# Patient Record
Sex: FEMALE | Race: WHITE
[De-identification: names, ages, dates, MRNs, and addresses within clinical notes are randomized per-mention and may not be internally consistent; named-entity substitution may affect disease eponyms.]

---

## 2018-05-02 ENCOUNTER — HOSPITAL ENCOUNTER (EMERGENCY)
Dept: HOSPITAL 25 - ED | Age: 18
Discharge: HOME | End: 2018-05-02
Payer: SELF-PAY

## 2018-05-02 DIAGNOSIS — I82.4Z1: Primary | ICD-10-CM

## 2018-05-02 DIAGNOSIS — M79.604: ICD-10-CM

## 2018-05-02 LAB
BASOPHILS # BLD AUTO: 0.1 10^3/UL (ref 0–0.2)
EOSINOPHIL # BLD AUTO: 0.2 10^3/UL (ref 0–0.6)
HCT VFR BLD AUTO: 42 % (ref 35–47)
HGB BLD-MCNC: 14.2 G/DL (ref 12–16)
INR PPP/BLD: 0.85 (ref 0.77–1.02)
LYMPHOCYTES # BLD AUTO: 2.8 10^3/UL (ref 1–4.8)
MCH RBC QN AUTO: 30 PG (ref 27–31)
MCHC RBC AUTO-ENTMCNC: 34 G/DL (ref 31–36)
MCV RBC AUTO: 89 FL (ref 80–97)
MONOCYTES # BLD AUTO: 0.7 10^3/UL (ref 0–0.8)
NEUTROPHILS # BLD AUTO: 5.7 10^3/UL (ref 1.5–7.7)
NRBC # BLD AUTO: 0 10^3/UL
NRBC BLD QL AUTO: 0
PLATELET # BLD AUTO: 207 10^3/UL (ref 150–450)
RBC # BLD AUTO: 4.69 10^6/UL (ref 4–5.4)
WBC # BLD AUTO: 9.4 10^3/UL (ref 3.5–10.8)

## 2018-05-02 PROCEDURE — 99282 EMERGENCY DEPT VISIT SF MDM: CPT

## 2018-05-02 PROCEDURE — 85730 THROMBOPLASTIN TIME PARTIAL: CPT

## 2018-05-02 PROCEDURE — 85025 COMPLETE CBC W/AUTO DIFF WBC: CPT

## 2018-05-02 PROCEDURE — 80053 COMPREHEN METABOLIC PANEL: CPT

## 2018-05-02 PROCEDURE — 36415 COLL VENOUS BLD VENIPUNCTURE: CPT

## 2018-05-02 PROCEDURE — 85610 PROTHROMBIN TIME: CPT

## 2018-05-02 NOTE — RAD
HISTORY: Right lower leg pain



COMPARISONS: None relevant



TECHNIQUE: Multiple transverse and longitudinal ultrasound images were obtained of the

right lower extremity from the level of the common femoral vein inferiorly through to the

infrapopliteal veins  using grayscale, color Doppler, and spectral Doppler imaging with

and without compression and with augmentation.    



FINDINGS:

VEINS: There is partially occlusive thrombus noted within the inferior extent of the right

femoral vein. There is occlusive thrombus within the peroneal vein..



SOFT TISSUES: Unremarkable.



OTHER FINDINGS: None.



IMPRESSION: 

PARTIALLY OCCLUSIVE THROMBUS OF THE INFERIOR EXTENT OF THE RIGHT FEMORAL VEIN, WITH

OCCLUSIVE THROMBUS OF A CALF VEIN.

## 2018-05-02 NOTE — ED
Lower Extremity





- HPI Summary


HPI Summary: 


17 female presents with right lower leg pain for the past 2 weeks.  States the 

pain woke him out of his sleep.  States pain is worse when she palpates area.  

It is also worse when she ambulates.  She denies any known injury.  She denies 

any fevers.  She denies any spreading redness.  She denies history of blood 

clots.  She admits to family history of blood clots.  She denies any chest pain 

or shortness breath.  She has been a little bit more active than normal.  She 

just got out of prison.  She states her shoes are not old.  She is a smoker and 

she is on testerone. 





- History of Current Complaint


Chief Complaint: EDExtremityLower


Stated Complaint: RT LEG PAIN


Time Seen by Provider: 05/02/18 18:34


Pain Intensity: 7





- Allergies/Home Medications


Allergies/Adverse Reactions: 


 Allergies











Allergy/AdvReac Type Severity Reaction Status Date / Time


 


aspirin Allergy  Anaphylatic Verified 05/02/18 18:32





   Shock  


 


Penicillins Allergy  Anaphylatic Verified 05/02/18 18:32





   Shock  














PMH/Surg Hx/FS Hx/Imm Hx


Endocrine/Hematology History: 


   Denies: Hx Anticoagulant Therapy


Respiratory History: 


   Denies: Hx Asthma


Infectious Disease History: No


Infectious Disease History: 


   Denies: Traveled Outside the [place] in Last 30 Days





- Family History


Known Family History: 


   Negative: Blood Disorder





Review of Systems


Negative: Fever


Negative: Chest Pain


Negative: Shortness Of Breath


Positive: Myalgia - right shin


All Other Systems Reviewed And Are Negative: Yes





Physical Exam


Triage Information Reviewed: Yes


Vital Signs On Initial Exam: 


 Initial Vitals











Temp Pulse Resp BP Pulse Ox


 


 99.1 F   94   16   124/78   98 


 


 05/02/18 18:29  05/02/18 18:29  05/02/18 18:29  05/02/18 18:29  05/02/18 18:29











Vital Signs Reviewed: Yes


Appearance: Positive: Well-Appearing


Skin: Positive: Warm, Dry


Head/Face: Positive: Normal Head/Face Inspection


Eyes: Positive: Normal, Conjunctiva Clear


Respiratory/Lung Sounds: Positive: Clear to Auscultation, Breath Sounds Present


Cardiovascular: Positive: Normal, RRR


Musculoskeletal: Positive: Strength/ROM Intact - right leg, Other - good pulses

, tenderness over right shin, good pulses, sensation grossly intact.  Negative: 

Edema Right


Neurological: Positive: Normal


Psychiatric: Positive: Normal





Diagnostics





- Vital Signs


 Vital Signs











  Temp Pulse Resp BP Pulse Ox


 


 05/02/18 18:29  99.1 F  94  16  124/78  98














- Laboratory


Result Diagrams: 


 05/02/18 20:30





 05/02/18 20:30


Lab Statement: Any lab studies that have been ordered have been reviewed, and 

results considered in the medical decision making process.





- Radiology


  ** lower leg


Xray Interpretation: No Acute Changes


Radiology Interpretation Completed By: Radiologist





- Ultrasound


  ** No standard instances


Ultrasound Interpretation: Positive (See Comments) - IMPRESSION: PARTIALLY 

OCCLUSIVE THROMBUS OF THE INFERIOR EXTENT OF THE RIGHT FEMORAL VEIN, WITH 

OCCLUSIVE THROMBUS OF A CALF VEIN.


Ultrasound Interpretation Completed By: Radiologist





Lower Extremity Course/Dx





- Course


Course Of Treatment: 17 female presents with right lower leg pain for the past 

2 weeks.  States the pain woke him out of his sleep.  States pain is worse when 

she palpates area.  It is also worse when she ambulates.  She denies any known 

injury.  She denies any fevers.  She denies any spreading redness.  She denies 

history of blood clots.  She admits to family history of blood clots.  She 

denies any chest pain or shortness breath.  She has been a little bit more 

active than normal.  She just got out of prison.  She states her shoes are not 

old.  She is a smoker and she is on testerone. xray normal. u/s shows clot in 

right femoral vein and calf vein. discussed options and will treat xarelto. 

told to follow up with primary for continued care. patient understand and 

agrees with plan.





- Diagnoses


Differential Diagnosis/HQI/PQRI: Positive: DVT, Fracture (Closed), Sprain, 

Strain


Provider Diagnoses: 


 Right leg DVT








Discharge





- Sign-Out/Discharge


Documenting (check all that apply): Discharge/Admit/Transfer





- Discharge Plan


Condition: Good


Disposition: HOME


Prescriptions: 


Rivaroxaban TAB(*) [Xarelto 15 mg(*)] 15 mg PO BID #41 tab


Patient Education Materials:  Rivaroxaban (By mouth), Deep Vein Thrombosis (ED)


Referrals: 


[facility] [place] [place] [place]


Additional Instructions: 


Take xarelto twice a day for 21 days then once a day


Take with food


Avoid Aspirin or ibuprofen, use Tylenol for pain


Follow up with primary care physician for continued care


Return to ED if develop any chest pain or SOB any new or worsening symptoms   








- Billing Disposition and Condition


Condition: GOOD


Disposition: HOME

## 2018-05-02 NOTE — RAD
HISTORY: Right leg pain



COMPARISONS: None



VIEWS: 2, Frontal and lateral views of the right foreleg



FINDINGS:



BONE DENSITY: Normal.

BONES: There is no displaced fracture. There is no appreciable erosion or periosteal

reaction.

JOINTS: There is no arthropathy.    

ALIGNMENT: There is no dislocation. 

SOFT TISSUES: Unremarkable.



OTHER FINDINGS: None.



IMPRESSION: 

NO ACUTE OSSEOUS INJURY. IF SYMPTOMS PERSIST, RECOMMEND REPEAT IMAGING.

## 2018-05-03 VITALS — SYSTOLIC BLOOD PRESSURE: 125 MMHG | DIASTOLIC BLOOD PRESSURE: 83 MMHG

## 2018-05-06 ENCOUNTER — HOSPITAL ENCOUNTER (EMERGENCY)
Dept: HOSPITAL 25 - ED | Age: 18
Discharge: HOME | End: 2018-05-06
Payer: COMMERCIAL

## 2018-05-06 VITALS — SYSTOLIC BLOOD PRESSURE: 108 MMHG | DIASTOLIC BLOOD PRESSURE: 84 MMHG

## 2018-05-06 DIAGNOSIS — R31.9: Primary | ICD-10-CM

## 2018-05-06 DIAGNOSIS — Z88.0: ICD-10-CM

## 2018-05-06 LAB
BASOPHILS # BLD AUTO: 0.1 10^3/UL (ref 0–0.2)
EOSINOPHIL # BLD AUTO: 0.1 10^3/UL (ref 0–0.6)
HCT VFR BLD AUTO: 43 % (ref 42–52)
HGB BLD-MCNC: 14.6 G/DL (ref 14–18)
INR PPP/BLD: 1.06 (ref 0.77–1.02)
LYMPHOCYTES # BLD AUTO: 1.5 10^3/UL (ref 1–4.8)
MCH RBC QN AUTO: 31 PG (ref 27–31)
MCHC RBC AUTO-ENTMCNC: 34 G/DL (ref 31–36)
MCV RBC AUTO: 89 FL (ref 80–94)
MONOCYTES # BLD AUTO: 0.6 10^3/UL (ref 0–0.8)
NEUTROPHILS # BLD AUTO: 4.3 10^3/UL (ref 1.5–7.7)
NRBC # BLD AUTO: 0 10^3/UL
NRBC BLD QL AUTO: 0
PLATELET # BLD AUTO: 193 10^3/UL (ref 150–450)
RBC # BLD AUTO: 4.8 10^6/UL (ref 4–5.4)
WBC # BLD AUTO: 6.5 10^3/UL (ref 3.5–10.8)

## 2018-05-06 PROCEDURE — 85610 PROTHROMBIN TIME: CPT

## 2018-05-06 PROCEDURE — 99282 EMERGENCY DEPT VISIT SF MDM: CPT

## 2018-05-06 PROCEDURE — 85025 COMPLETE CBC W/AUTO DIFF WBC: CPT

## 2018-05-06 PROCEDURE — 81003 URINALYSIS AUTO W/O SCOPE: CPT

## 2018-05-06 PROCEDURE — 81015 MICROSCOPIC EXAM OF URINE: CPT

## 2018-05-06 PROCEDURE — 36415 COLL VENOUS BLD VENIPUNCTURE: CPT

## 2018-05-06 PROCEDURE — 80053 COMPREHEN METABOLIC PANEL: CPT

## 2018-05-06 NOTE — ED
Corina RUTH Elizabeth, scribed for Daniel Gan MD on 05/06/18 at 1043 .





GI/ HPI





- HPI Summary


HPI Summary: 


This patient is a 17 year old F presenting to [facility] from [facility] via EMS with a 

chief complaint of hematuria since 1 day ago. Patient notes that she thought it 

was her period but was convinced to seek medical help at the insistence of the 

[facility] staff.


Symptoms alleviated by nothing. The patient is a transgender male currently on 

hormone replacement therapy. The patient has been taking Xarelto for 7 days 

after recently being diagnosed with a blood clot in the leg. The patient has a 

hx of drug addiction.





- History of Current Complaint


Stated Complaint: ABNORMAL BLEEDING


Hx Obtained From: Patient, EMS


Onset/Duration: Started Days Ago - started 1 day ago


Timing: Intermittent


Severity: Moderate


Current Severity: Moderate


Pain Intensity: 0


Associated Signs and Symptoms: Positive: Hematuria, Other: - leg pain


Alleviating Factor(s): Nothing





- Allergy/Home Medications


Allergies/Adverse Reactions: 


 Allergies











Allergy/AdvReac Type Severity Reaction Status Date / Time


 


aspirin Allergy  Anaphylatic Verified 05/06/18 10:32





   Shock  


 


bee pollen Allergy  Anaphylatic Verified 05/06/18 10:32





   Shock  


 


bee venom protein (honey bee) Allergy  Anaphylatic Verified 05/06/18 10:32





   Shock  


 


Penicillins Allergy  Anaphylatic Verified 05/06/18 10:32





   Shock  


 


SEAFOOD Allergy  Anaphylatic Uncoded 05/06/18 10:32





   Shock  











Home Medications: 


 Home Medications





Lamotrigine XR (NF) [Lamictal XR (NF)] 50 mg PO DAILY 05/06/18 [History 

Confirmed 05/06/18]


LoraTADine TAB(NF) [Claritin 10 MG TAB(NF)] 10 mg PO DAILY 05/06/18 [History 

Confirmed 05/06/18]


Magnesium Oxide TAB* [MagOx 400 TAB*] 400 mg PO DAILY 05/06/18 [History 

Confirmed 05/06/18]


Nicotine PATCH 14 MG/24 HR* 14 mg TRANSDERM DAILY 05/06/18 [History Confirmed 05 /06/18]


OXcarbazepine TAB(*) [Trileptal 300 mg TAB(*)] 75 mg PO BID 05/06/18 [History 

Confirmed 05/06/18]


Prazosin CAP* [Minipress CAP*] 10 mg PO BEDTIME 05/06/18 [History Confirmed 05/ 06/18]


QUEtiapine TAB* [Seroquel TAB*] 300 mg PO BEDTIME 05/06/18 [History Confirmed 05 /06/18]


Sertraline* [Zoloft*] 125 mg PO DAILY 05/06/18 [History Confirmed 05/06/18]


Testosterone Cypionate (NF) 200 mg IM Q14D 05/06/18 [History Confirmed 05/06/18]











PMH/Surg Hx/FS Hx/Imm Hx


Cardiovascular History: Reports: Other Cardiovascular Problems/Disorders - hx 

of blood clots


Respiratory History: Reports: Hx Asthma


Infectious Disease History: No


Infectious Disease History: 


   Denies: Traveled Outside the [place] in Last 30 Days





- Family History


Known Family History: Positive: None - pt denies any family hx





- Social History


Hx Substance Use: Yes





Review of Systems


Negative: Epistaxis


Positive: hematuria


Musculoskeletal: Other - leg pain


All Other Systems Reviewed And Are Negative: Yes





Physical Exam





- Summary


Physical Exam Summary: 


Appearance: The patient is well-nourished in no acute distress and in no acute 

pain.


 


Skin: The skin is warm and dry and skin color reflects adequate perfusion.


 


HEENT: The head is normocephalic and atraumatic. The pupils are equal and 

reactive. The conjunctivae are clear and without drainage. Nares are patent and 

without drainage. Mouth reveals moist mucous membranes and the throat is 

without erythema and exudate. The external ears are intact. The ear canals are 

patent and without drainage. The tympanic membranes are intact.


 


Neck: the neck is supple with full range of motion and non-tender. There are no 

carotid bruits. There is no neck vein distension.


 


Respiratory: Chest is non-tender. Lungs are clear to auscultation and breath 

sounds are symmetrical and equal.


 


Cardiovascular: Heart is regular rate and rhythm. There is no murmur or rub 

auscultated. There is no peripheral edema and pulses are symmetrical and equal.


 


Abdomen: The abdomen is soft and non-tender. There are normal bowel sounds 

heard in all four quadrants and there is no organomegaly palpated.


 


Musculoskeletal: There is no back tenderness noted. Extremities are non-tender 

with full range of motion. There is good capillary refill. There is no 

peripheral edema or calf tenderness elicited.


 


Neurological: Patient is alert and oriented to person, place and time. The 

patient has symmetrical motor strength in all four extremities. Cranial nerves 

are grossly intact. Deep tendon reflexes are symmetrical and equal in all four 

extremities.


 


Psychiatric: The patient has an appropriate affect and does not exhibit any 

anxiety or depression.





Triage Information Reviewed: Yes


Vital Signs On Initial Exam: 


 Initial Vitals











Temp Pulse Resp BP Pulse Ox


 


 99.0 F   93   15   118/75   98 


 


 05/06/18 10:29  05/06/18 10:29  05/06/18 10:29  05/06/18 10:29  05/06/18 10:29











Vital Signs Reviewed: Yes





Diagnostics





- Vital Signs


 Vital Signs











  Temp Pulse Resp BP Pulse Ox


 


 05/06/18 10:29  99.0 F  93  15  118/75  98














- Laboratory


Lab Results: 


 Lab Results











  05/06/18 05/06/18 05/06/18 Range/Units





  10:43 11:12 11:12 


 


WBC   6.5   (3.5-10.8)  10^3/ul


 


RBC   4.80   (4.0-5.4)  10^6/ul


 


Hgb   14.6   (14.0-18.0)  g/dl


 


Hct   43   (42-52)  %


 


MCV   89   (80-94)  fL


 


MCH   31   (27-31)  pg


 


MCHC   34   (31-36)  g/dl


 


RDW   13   (10.5-15)  %


 


Plt Count   193   (150-450)  10^3/ul


 


MPV   8.7   (7.4-10.4)  um3


 


Neut % (Auto)   65.8   (38-83)  %


 


Lymph % (Auto)   22.8 L   (25-47)  %


 


Mono % (Auto)   8.5 H   (0-7)  %


 


Eos % (Auto)   2.1   (0-6)  %


 


Baso % (Auto)   0.8   (0-2)  %


 


Absolute Neuts (auto)   4.3   (1.5-7.7)  10^3/ul


 


Absolute Lymphs (auto)   1.5   (1.0-4.8)  10^3/ul


 


Absolute Monos (auto)   0.6   (0-0.8)  10^3/ul


 


Absolute Eos (auto)   0.1   (0-0.6)  10^3/ul


 


Absolute Basos (auto)   0.1   (0-0.2)  10^3/ul


 


Absolute Nucleated RBC   0   10^3/ul


 


Nucleated RBC %   0   


 


INR (Anticoag Therapy)    1.06 H  (0.77-1.02)  


 


Sodium     (139-145)  mmol/L


 


Potassium     (3.5-5.0)  mmol/L


 


Chloride     (101-111)  mmol/L


 


Carbon Dioxide     (22-32)  mmol/L


 


Anion Gap     (2-11)  mmol/L


 


BUN     (6-24)  mg/dL


 


Creatinine     (0.51-0.95)  mg/dL


 


Est GFR ( Amer)     (>60)  


 


Est GFR (Non-Af Amer)     (>60)  


 


BUN/Creatinine Ratio     (8-20)  


 


Glucose     ()  mg/dL


 


Calcium     (8.6-10.3)  mg/dL


 


Total Bilirubin     (0.2-1.0)  mg/dL


 


AST     (13-39)  U/L


 


ALT     (7-52)  U/L


 


Alkaline Phosphatase     ()  U/L


 


Total Protein     (6.4-8.9)  g/dL


 


Albumin     (3.2-5.2)  g/dL


 


Globulin     (2-4)  g/dL


 


Albumin/Globulin Ratio     (1-3)  


 


Urine Color  Yellow    


 


Urine Appearance  Cloudy    


 


Urine pH  7.0    (5-9)  


 


Ur Specific Gravity  1.013    (1.010-1.030)  


 


Urine Protein  Negative    (Negative)  


 


Urine Ketones  Negative    (Negative)  


 


Urine Blood  1+ A    (Negative)  


 


Urine Nitrate  Negative    (Negative)  


 


Urine Bilirubin  Negative    (Negative)  


 


Urine Urobilinogen  Negative    (Negative)  


 


Ur Leukocyte Esterase  Negative    (Negative)  


 


Urine WBC (Auto)  Absent    (Absent)  


 


Urine RBC (Auto)  1+(3-5/hpf) A    (Absent)  


 


Ur Squamous Epith Cells  Present A    (Absent)  


 


Urine Bacteria  Absent    (Absent)  


 


Urine Glucose  Negative    (Negative)  














  05/06/18 Range/Units





  11:12 


 


WBC   (3.5-10.8)  10^3/ul


 


RBC   (4.0-5.4)  10^6/ul


 


Hgb   (14.0-18.0)  g/dl


 


Hct   (42-52)  %


 


MCV   (80-94)  fL


 


MCH   (27-31)  pg


 


MCHC   (31-36)  g/dl


 


RDW   (10.5-15)  %


 


Plt Count   (150-450)  10^3/ul


 


MPV   (7.4-10.4)  um3


 


Neut % (Auto)   (38-83)  %


 


Lymph % (Auto)   (25-47)  %


 


Mono % (Auto)   (0-7)  %


 


Eos % (Auto)   (0-6)  %


 


Baso % (Auto)   (0-2)  %


 


Absolute Neuts (auto)   (1.5-7.7)  10^3/ul


 


Absolute Lymphs (auto)   (1.0-4.8)  10^3/ul


 


Absolute Monos (auto)   (0-0.8)  10^3/ul


 


Absolute Eos (auto)   (0-0.6)  10^3/ul


 


Absolute Basos (auto)   (0-0.2)  10^3/ul


 


Absolute Nucleated RBC   10^3/ul


 


Nucleated RBC %   


 


INR (Anticoag Therapy)   (0.77-1.02)  


 


Sodium  139  (139-145)  mmol/L


 


Potassium  3.9  (3.5-5.0)  mmol/L


 


Chloride  104  (101-111)  mmol/L


 


Carbon Dioxide  28  (22-32)  mmol/L


 


Anion Gap  7  (2-11)  mmol/L


 


BUN  12  (6-24)  mg/dL


 


Creatinine  0.80  (0.51-0.95)  mg/dL


 


Est GFR ( Amer)  118.8  (>60)  


 


Est GFR (Non-Af Amer)  92.4  (>60)  


 


BUN/Creatinine Ratio  15.0  (8-20)  


 


Glucose  86  ()  mg/dL


 


Calcium  10.0  (8.6-10.3)  mg/dL


 


Total Bilirubin  0.40  (0.2-1.0)  mg/dL


 


AST  19  (13-39)  U/L


 


ALT  17  (7-52)  U/L


 


Alkaline Phosphatase  69  ()  U/L


 


Total Protein  7.1  (6.4-8.9)  g/dL


 


Albumin  4.4  (3.2-5.2)  g/dL


 


Globulin  2.7  (2-4)  g/dL


 


Albumin/Globulin Ratio  1.6  (1-3)  


 


Urine Color   


 


Urine Appearance   


 


Urine pH   (5-9)  


 


Ur Specific Gravity   (1.010-1.030)  


 


Urine Protein   (Negative)  


 


Urine Ketones   (Negative)  


 


Urine Blood   (Negative)  


 


Urine Nitrate   (Negative)  


 


Urine Bilirubin   (Negative)  


 


Urine Urobilinogen   (Negative)  


 


Ur Leukocyte Esterase   (Negative)  


 


Urine WBC (Auto)   (Absent)  


 


Urine RBC (Auto)   (Absent)  


 


Ur Squamous Epith Cells   (Absent)  


 


Urine Bacteria   (Absent)  


 


Urine Glucose   (Negative)  











Result Diagrams: 


 05/06/18 11:12





 05/06/18 11:12


Lab Statement: Any lab studies that have been ordered have been reviewed, and 

results considered in the medical decision making process.





GIGU Course/Dx





- Course


Course Of Treatment: Mr. Dunn had an episode of painless, gross hematuria 

last night and also the first urination this AM. He has no other C/O.  He was 

started on xarelto a week ago for DVT in his right leg.  HE does have 

intermittent pain in that leg.  He had only microscopic hematuria here with 

otherwise normal labs and I think it is premature to decide that he can't 

tolerate xarelto.  I encouraged close F/U.





- Diagnoses


Provider Diagnoses: 


 Hematuria








Discharge





- Sign-Out/Discharge


Documenting (check all that apply): Discharge/Admit/Transfer





- Discharge Plan


Condition: Stable


Disposition: HOME


Patient Education Materials:  Hematuria (ED)


Referrals: 


Rodney Spann MD [Medical Doctor] - 2 Days


Additional Instructions: 


Follow up with Dr. Spann, urologist, in 2-3 days. Return to the emergency 

department with any new or worsening symptoms.





- Billing Disposition and Condition


Condition: STABLE


Disposition: HOME





The documentation as recorded by the Corina alvares Elizabeth accurately 

reflects the service I personally performed and the decisions made by me, Daniel Gan MD.